# Patient Record
Sex: MALE | Race: WHITE | Employment: UNEMPLOYED | URBAN - METROPOLITAN AREA
[De-identification: names, ages, dates, MRNs, and addresses within clinical notes are randomized per-mention and may not be internally consistent; named-entity substitution may affect disease eponyms.]

---

## 2023-01-05 ENCOUNTER — HOSPITAL ENCOUNTER (OUTPATIENT)
Dept: PREADMISSION TESTING | Age: 58
Discharge: HOME | End: 2023-01-05
Payer: MEDICARE

## 2023-01-05 VITALS
BODY MASS INDEX: 25.61 KG/M2 | DIASTOLIC BLOOD PRESSURE: 85 MMHG | HEART RATE: 72 BPM | RESPIRATION RATE: 18 BRPM | TEMPERATURE: 97.8 F | OXYGEN SATURATION: 97 % | SYSTOLIC BLOOD PRESSURE: 140 MMHG | HEIGHT: 64 IN | WEIGHT: 150 LBS

## 2023-01-05 LAB
ANION GAP SERPL CALCULATED.3IONS-SCNC: 10 MMOL/L (ref 9–17)
CHLORIDE BLD-SCNC: 101 MMOL/L (ref 98–107)
CO2: 25 MMOL/L (ref 20–31)
EKG ATRIAL RATE: 58 BPM
EKG P AXIS: 46 DEGREES
EKG P-R INTERVAL: 192 MS
EKG Q-T INTERVAL: 400 MS
EKG QRS DURATION: 96 MS
EKG QTC CALCULATION (BAZETT): 392 MS
EKG R AXIS: 81 DEGREES
EKG T AXIS: 65 DEGREES
EKG VENTRICULAR RATE: 58 BPM
HCT VFR BLD CALC: 42.3 % (ref 40.7–50.3)
HEMOGLOBIN: 14.2 G/DL (ref 13–17)
MCH RBC QN AUTO: 29.5 PG (ref 25.2–33.5)
MCHC RBC AUTO-ENTMCNC: 33.6 G/DL (ref 28.4–34.8)
MCV RBC AUTO: 87.8 FL (ref 82.6–102.9)
NRBC AUTOMATED: 0 PER 100 WBC
PDW BLD-RTO: 12.9 % (ref 11.8–14.4)
PLATELET # BLD: 233 K/UL (ref 138–453)
PMV BLD AUTO: 9.3 FL (ref 8.1–13.5)
POTASSIUM SERPL-SCNC: 4.1 MMOL/L (ref 3.7–5.3)
RBC # BLD: 4.82 M/UL (ref 4.21–5.77)
SODIUM BLD-SCNC: 136 MMOL/L (ref 135–144)
WBC # BLD: 9.4 K/UL (ref 3.5–11.3)

## 2023-01-05 PROCEDURE — 93005 ELECTROCARDIOGRAM TRACING: CPT | Performed by: STUDENT IN AN ORGANIZED HEALTH CARE EDUCATION/TRAINING PROGRAM

## 2023-01-05 PROCEDURE — 36415 COLL VENOUS BLD VENIPUNCTURE: CPT

## 2023-01-05 PROCEDURE — 85027 COMPLETE CBC AUTOMATED: CPT

## 2023-01-05 PROCEDURE — 80051 ELECTROLYTE PANEL: CPT

## 2023-01-05 RX ORDER — OXYCODONE HYDROCHLORIDE 15 MG/1
15 TABLET ORAL EVERY 4 HOURS PRN
COMMUNITY

## 2023-01-05 RX ORDER — CLONIDINE HYDROCHLORIDE 0.1 MG/1
0.1 TABLET ORAL DAILY
COMMUNITY

## 2023-01-05 RX ORDER — LISINOPRIL 20 MG/1
20 TABLET ORAL DAILY
COMMUNITY

## 2023-01-05 RX ORDER — SENNOSIDES 8.6 MG
650 CAPSULE ORAL EVERY 8 HOURS PRN
COMMUNITY

## 2023-01-05 RX ORDER — ALBUTEROL SULFATE 90 UG/1
2 AEROSOL, METERED RESPIRATORY (INHALATION) EVERY 4 HOURS PRN
COMMUNITY
Start: 2017-06-13

## 2023-01-05 ASSESSMENT — PAIN DESCRIPTION - LOCATION: LOCATION: BACK;LEG

## 2023-01-05 ASSESSMENT — PAIN DESCRIPTION - PAIN TYPE: TYPE: CHRONIC PAIN

## 2023-01-05 ASSESSMENT — PAIN DESCRIPTION - FREQUENCY: FREQUENCY: CONTINUOUS

## 2023-01-05 ASSESSMENT — PAIN DESCRIPTION - ORIENTATION: ORIENTATION: LOWER;LEFT

## 2023-01-05 ASSESSMENT — PAIN DESCRIPTION - DESCRIPTORS: DESCRIPTORS: ACHING;SORE

## 2023-01-05 ASSESSMENT — PAIN SCALES - GENERAL: PAINLEVEL_OUTOF10: 8

## 2023-01-05 NOTE — PRE-PROCEDURE INSTRUCTIONS
[facility] [address] Wednesday, January  at 200 PM    Once you enter the hospital lobby, take the elevators to the second floor. Check-In is at the surgery registration desk. Continue to take your home medications as you normally do up to and including the night before surgery with the exception of any blood thinning medications. Please stop any blood thinning medications as directed by your surgeon or prescribing physician. Failure to stop certain medications may interfere with your scheduled surgery. These may include:  Aspirin, Warfarin (Coumadin), Clopidogrel (Plavix), Ibuprofen (Motrin, Advil), Naproxen (Aleve), Meloxicam (Mobic), Celecoxib (Celebrex), Eliquis, Pradaxa, Xarelto, Effient, Fish Oil, Herbal supplements. Please take the following medication(s) the day of surgery with a small sip of water:  Clonidine    Please use your inhaler(s) if needed and bring your inhaler(s) from home the day of surgery. PREPARING FOR YOUR SURGERY:     Before surgery, you can play an important role in your own health. Because skin is not sterile, we need to be sure that your skin is as free of germs as possible before surgery by carefully washing before surgery. Preparing or prepping skin before surgery can reduce the risk of a surgical site infection.   Do not shave the area of your body where your surgery will be performed unless you received specific permission from your physician. You will need to shower at home the night before surgery and the morning of surgery with a special soap called chlorhexidine gluconate (CHG*). *Not to be used by people allergic to Chlorhexidine Gluconate (CHG). Following these instructions will help you be sure that your skin is clean before surgery. Instructions on cleaning your skin before surgery: The night before your surgery: You will need to shower with warm water (not hot) and the CHG soap.       Use a clean wash cloth and a clean towel. Have clean clothes available to put on after the shower. First wash your hair with regular shampoo. Rinse your hair and body thoroughly to remove the shampoo. Wash your face and genital area (private parts) with your regular soap or water only. Thoroughly rinse your body with warm water from the neck down. Turn water off to prevent rinsing the soap off too soon. With a clean wet washcloth and half of the CHG soap in the bottle, lather your entire body from the neck down. Do not use CHG soap near your eyes or ears to avoid injury to those areas. Wash thoroughly, paying special attention to the area where your surgery will be performed. Wash your body gently for five (5) minutes. Avoid scrubbing your skin too hard. Turn the water back on and rinse your body thoroughly. Pat yourself dry with a clean, soft towel. Do not apply lotion, cream or powder. Dress with clean freshly washed clothes. The morning of surgery:    Repeat shower following steps above - using remaining half of CHG soap in bottle. Patient Instructions: If you are having any type of anesthesia you are to have nothing to eat or drink after midnight the night before your surgery. This includes gum, hard candy, mints, water or smoking or chewing tobacco.  The only exception to this is a small sip of water to take with any morning dose of heart, blood pressure, or seizure medications. No alcoholic beverages for 24 hours prior to surgery. Brush your teeth but do not swallow water. Bring your eye glasses and case with you. No contacts are to be worn the day of surgery. You also may bring your hearing aids. Most surgical procedures involving anesthesia will require that you remove your dentures prior to surgery. Do not wear any jewelry or body piercings day of surgery. Also, NO lotion, perfume or deodorant to be used the day of surgery.   No nail polish on the operative extremity (arm/leg surgeries)    If you are staying overnight with us, please bring a small bag of necessary personal items. Please wear loose, comfortable clothing. If you are potentially going to have a cast or brace bring clothing that will fit over them. In case of illness - If you have cold or flu like symptoms (high fever, runny nose, sore throat, cough, etc.) rash, nausea, vomiting, loose stools, and/or recent contact with someone who has a contagious disease (chicken pox, measles, etc.) Please call your doctor before coming to the hospital.         Day of Surgery/Procedure:    As a patient at [facility] you can expect quality medical and nursing care that is centered on your individual needs. Our goal is to make your surgical experience as comfortable as possible    . Transportation After Your Surgery/Procedure: You will need a friend or family member to drive you home after your procedure. Your  must be 25years of age or older and able to sign off on your discharge instructions. A taxi cab or any other form of public transportation is not acceptable. Your friend or family member must stay at the hospital throughout your procedure. Someone must remain with you for the first 24 hours after your surgery if you receive anesthesia or medication. If you do not have someone to stay with you, your procedure may be cancelled.       If you have any other questions regarding your procedure or the day of surgery, please call [phone]      _________________________  ____________________________  Signature (Patient)              Signature (Provider)               Date
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
## 2023-01-06 ENCOUNTER — ANESTHESIA EVENT (OUTPATIENT)
Dept: OPERATING ROOM | Age: 58
End: 2023-01-06
Payer: MEDICARE

## 2023-01-06 NOTE — FLOWSHEET NOTE
01/06/23 1325   Anesthesia PAT Clearance   Anesthesia Review Status Anes has reviewed patient for surgery  (Dr. Maria Victoria Smalls reviewed history and h/p with no further intervention)

## 2023-01-11 ENCOUNTER — ANESTHESIA (OUTPATIENT)
Dept: OPERATING ROOM | Age: 58
End: 2023-01-11
Payer: MEDICARE

## 2023-01-11 ENCOUNTER — HOSPITAL ENCOUNTER (OUTPATIENT)
Age: 58
Setting detail: OUTPATIENT SURGERY
Discharge: HOME | End: 2023-01-11
Attending: PAIN MEDICINE | Admitting: PAIN MEDICINE
Payer: MEDICARE

## 2023-01-11 ENCOUNTER — APPOINTMENT (OUTPATIENT)
Dept: GENERAL RADIOLOGY | Age: 58
End: 2023-01-11
Attending: PAIN MEDICINE
Payer: MEDICARE

## 2023-01-11 VITALS
WEIGHT: 152.3 LBS | RESPIRATION RATE: 17 BRPM | OXYGEN SATURATION: 95 % | HEIGHT: 64 IN | HEART RATE: 56 BPM | SYSTOLIC BLOOD PRESSURE: 106 MMHG | TEMPERATURE: 97.5 F | DIASTOLIC BLOOD PRESSURE: 84 MMHG | BODY MASS INDEX: 26 KG/M2

## 2023-01-11 PROCEDURE — 2500000003 HC RX 250 WO HCPCS: Performed by: PAIN MEDICINE

## 2023-01-11 PROCEDURE — 2709999900 HC NON-CHARGEABLE SUPPLY: Performed by: PAIN MEDICINE

## 2023-01-11 PROCEDURE — 3600000054 HC PAIN LEVEL 3 BASE: Performed by: PAIN MEDICINE

## 2023-01-11 PROCEDURE — 7100000011 HC PHASE II RECOVERY - ADDTL 15 MIN: Performed by: PAIN MEDICINE

## 2023-01-11 PROCEDURE — 3209999900 FLUORO FOR SURGICAL PROCEDURES

## 2023-01-11 PROCEDURE — 2580000003 HC RX 258: Performed by: ANESTHESIOLOGY

## 2023-01-11 PROCEDURE — C1778 LEAD, NEUROSTIMULATOR: HCPCS | Performed by: PAIN MEDICINE

## 2023-01-11 PROCEDURE — 3600000055 HC PAIN LEVEL 3 ADDL 15 MIN: Performed by: PAIN MEDICINE

## 2023-01-11 PROCEDURE — 3700000000 HC ANESTHESIA ATTENDED CARE: Performed by: PAIN MEDICINE

## 2023-01-11 PROCEDURE — 6360000002 HC RX W HCPCS: Performed by: ANESTHESIOLOGY

## 2023-01-11 PROCEDURE — 3700000001 HC ADD 15 MINUTES (ANESTHESIA): Performed by: PAIN MEDICINE

## 2023-01-11 PROCEDURE — 7100000010 HC PHASE II RECOVERY - FIRST 15 MIN: Performed by: PAIN MEDICINE

## 2023-01-11 DEVICE — O.R. CABLE 1X16, 61CM AND EXTENSION: Type: IMPLANTABLE DEVICE | Site: BACK | Status: FUNCTIONAL

## 2023-01-11 DEVICE — 50CM 16 CONTACT TRIAL LEAD KIT
Type: IMPLANTABLE DEVICE | Site: BACK | Status: FUNCTIONAL
Brand: INFINION™  16

## 2023-01-11 RX ORDER — OXYCODONE HYDROCHLORIDE 5 MG/1
5 TABLET ORAL
Status: DISCONTINUED | OUTPATIENT
Start: 2023-01-11 | End: 2023-01-11 | Stop reason: HOSPADM

## 2023-01-11 RX ORDER — HYDROMORPHONE HYDROCHLORIDE 1 MG/ML
0.25 INJECTION, SOLUTION INTRAMUSCULAR; INTRAVENOUS; SUBCUTANEOUS EVERY 5 MIN PRN
Status: DISCONTINUED | OUTPATIENT
Start: 2023-01-11 | End: 2023-01-11 | Stop reason: HOSPADM

## 2023-01-11 RX ORDER — MIDAZOLAM HYDROCHLORIDE 1 MG/ML
INJECTION INTRAMUSCULAR; INTRAVENOUS PRN
Status: DISCONTINUED | OUTPATIENT
Start: 2023-01-11 | End: 2023-01-11 | Stop reason: SDUPTHER

## 2023-01-11 RX ORDER — SODIUM CHLORIDE 9 MG/ML
INJECTION, SOLUTION INTRAVENOUS PRN
Status: DISCONTINUED | OUTPATIENT
Start: 2023-01-11 | End: 2023-01-11 | Stop reason: HOSPADM

## 2023-01-11 RX ORDER — SODIUM CHLORIDE, SODIUM LACTATE, POTASSIUM CHLORIDE, CALCIUM CHLORIDE 600; 310; 30; 20 MG/100ML; MG/100ML; MG/100ML; MG/100ML
INJECTION, SOLUTION INTRAVENOUS CONTINUOUS
Status: DISCONTINUED | OUTPATIENT
Start: 2023-01-11 | End: 2023-01-11 | Stop reason: HOSPADM

## 2023-01-11 RX ORDER — SODIUM CHLORIDE 0.9 % (FLUSH) 0.9 %
5-40 SYRINGE (ML) INJECTION EVERY 12 HOURS SCHEDULED
Status: DISCONTINUED | OUTPATIENT
Start: 2023-01-11 | End: 2023-01-11 | Stop reason: HOSPADM

## 2023-01-11 RX ORDER — DIPHENHYDRAMINE HYDROCHLORIDE 50 MG/ML
12.5 INJECTION INTRAMUSCULAR; INTRAVENOUS
Status: DISCONTINUED | OUTPATIENT
Start: 2023-01-11 | End: 2023-01-11 | Stop reason: HOSPADM

## 2023-01-11 RX ORDER — LIDOCAINE HYDROCHLORIDE 10 MG/ML
1 INJECTION, SOLUTION EPIDURAL; INFILTRATION; INTRACAUDAL; PERINEURAL
Status: DISCONTINUED | OUTPATIENT
Start: 2023-01-11 | End: 2023-01-11 | Stop reason: HOSPADM

## 2023-01-11 RX ORDER — SODIUM CHLORIDE 9 MG/ML
INJECTION, SOLUTION INTRAVENOUS CONTINUOUS
Status: DISCONTINUED | OUTPATIENT
Start: 2023-01-11 | End: 2023-01-11 | Stop reason: HOSPADM

## 2023-01-11 RX ORDER — CEFAZOLIN SODIUM 1 G/3ML
INJECTION, POWDER, FOR SOLUTION INTRAMUSCULAR; INTRAVENOUS PRN
Status: DISCONTINUED | OUTPATIENT
Start: 2023-01-11 | End: 2023-01-11 | Stop reason: SDUPTHER

## 2023-01-11 RX ORDER — FENTANYL CITRATE 50 UG/ML
INJECTION, SOLUTION INTRAMUSCULAR; INTRAVENOUS PRN
Status: DISCONTINUED | OUTPATIENT
Start: 2023-01-11 | End: 2023-01-11 | Stop reason: SDUPTHER

## 2023-01-11 RX ORDER — ONDANSETRON 2 MG/ML
4 INJECTION INTRAMUSCULAR; INTRAVENOUS
Status: DISCONTINUED | OUTPATIENT
Start: 2023-01-11 | End: 2023-01-11 | Stop reason: HOSPADM

## 2023-01-11 RX ORDER — PROPOFOL 10 MG/ML
INJECTION, EMULSION INTRAVENOUS CONTINUOUS PRN
Status: DISCONTINUED | OUTPATIENT
Start: 2023-01-11 | End: 2023-01-11 | Stop reason: SDUPTHER

## 2023-01-11 RX ORDER — SODIUM CHLORIDE 0.9 % (FLUSH) 0.9 %
5-40 SYRINGE (ML) INJECTION PRN
Status: DISCONTINUED | OUTPATIENT
Start: 2023-01-11 | End: 2023-01-11 | Stop reason: HOSPADM

## 2023-01-11 RX ORDER — BUPIVACAINE HYDROCHLORIDE 5 MG/ML
INJECTION, SOLUTION EPIDURAL; INTRACAUDAL PRN
Status: DISCONTINUED | OUTPATIENT
Start: 2023-01-11 | End: 2023-01-11 | Stop reason: ALTCHOICE

## 2023-01-11 RX ADMIN — CEFAZOLIN 2 G: 1 INJECTION, POWDER, FOR SOLUTION INTRAMUSCULAR; INTRAVENOUS at 16:47

## 2023-01-11 RX ADMIN — MIDAZOLAM 2 MG: 1 INJECTION INTRAMUSCULAR; INTRAVENOUS at 16:41

## 2023-01-11 RX ADMIN — SODIUM CHLORIDE, POTASSIUM CHLORIDE, SODIUM LACTATE AND CALCIUM CHLORIDE: 600; 310; 30; 20 INJECTION, SOLUTION INTRAVENOUS at 13:19

## 2023-01-11 RX ADMIN — PROPOFOL 50 MCG/KG/MIN: 10 INJECTION, EMULSION INTRAVENOUS at 16:41

## 2023-01-11 RX ADMIN — Medication 100 MCG: at 16:41

## 2023-01-11 ASSESSMENT — PAIN - FUNCTIONAL ASSESSMENT: PAIN_FUNCTIONAL_ASSESSMENT: 0-10

## 2023-01-11 ASSESSMENT — ENCOUNTER SYMPTOMS: SHORTNESS OF BREATH: 0

## 2023-01-11 ASSESSMENT — LIFESTYLE VARIABLES: SMOKING_STATUS: 1

## 2023-01-11 NOTE — DISCHARGE INSTRUCTIONS
Spinal Cord Stimulator Discharge Instructions  - Avoid any twisting, bending or exaggerated spine movement  -Do not lift anything heavier than a gallon of milk  -Walking around the house is recommended   - Keep the area of the procedure clean and dry  - Do not shower or bathe, sponge baths only  - Monitor for any signs of infection, such as, redness, swelling, green or yellow foul smelling drainage, increased pain or fever  - Wear abdominal binder as much as possible to support your spine and limit the movement of your spine

## 2023-01-11 NOTE — ANESTHESIA PRE PROCEDURE
Department of Anesthesiology  Preprocedure Note       Name:  Maury Mccarthy   Age:  62 y.o.  :  1965                                          MRN:  [id number]         Date:  2023      Surgeon: Kev Live):  Lyle Farris MD    Procedure: Procedure(s):  SPINAL CORD STIMULATOR IMPLANT TRIAL - [employer]    Medications prior to admission:   Prior to Admission medications    Medication Sig Start Date End Date Taking? Authorizing Provider   oxyCODONE (OXY-IR) 15 MG immediate release tablet Take 15 mg by mouth every 4 hours as needed for Pain.   Patient not taking: Reported on 2023    Historical Provider, MD   cloNIDine (CATAPRES) 0.1 MG tablet Take 0.1 mg by mouth daily    Historical Provider, MD   lisinopril (PRINIVIL;ZESTRIL) 20 MG tablet Take 20 mg by mouth daily    Historical Provider, MD   acetaminophen (TYLENOL) 650 MG extended release tablet Take 650 mg by mouth every 8 hours as needed for Pain    Historical Provider, MD   albuterol sulfate HFA (PROVENTIL;VENTOLIN;PROAIR) 108 (90 Base) MCG/ACT inhaler Inhale 2 puffs into the lungs every 4 hours as needed for Shortness of Breath or Wheezing 17   Historical Provider, MD       Current medications:    Current Facility-Administered Medications   Medication Dose Route Frequency Provider Last Rate Last Admin    lidocaine PF 1 % injection 1 mL  1 mL IntraDERmal Once PRN Aleksandar Hancock MD        0.9 % sodium chloride infusion   IntraVENous Continuous Ndal Gómez Morales MD        lactated ringers infusion   IntraVENous Continuous Therese Morales MD        sodium chloride flush 0.9 % injection 5-40 mL  5-40 mL IntraVENous 2 times per day Aleksandar Hancock MD        sodium chloride flush 0.9 % injection 5-40 mL  5-40 mL IntraVENous PRN Aleksandar Hancock MD        0.9 % sodium chloride infusion   IntraVENous PRN Aleksandar Hancock MD        ceFAZolin (ANCEF) 2000 mg in dextrose 5 % 50 mL IVPB  2,000 mg IntraVENous Once Lyle Farris MD           Allergies: Allergies   Allergen Reactions    Latex Hives    Other Other (See Comments)     Cat hair        Problem List:  There is no problem list on file for this patient. Past Medical History:        Diagnosis Date    [facility]erebral artery occlusion with cerebral infarction [facility]     1995    COPD (chronic obstructive pulmonary disease) ([facility]     DDD (degenerative disc disease), lumbar     Depression     Hyperlipidemia     diet controlled    Hypertension     Small vessel disease ([facility]     per mri per patient done in early 2000s       Past Surgical History:        Procedure Laterality Date    ANKLE FRACTURE SURGERY Bilateral     multiple times    CYST REMOVAL      jaw x 2    HERNIA REPAIR      umbilical    HIP ARTHROPLASTY Bilateral 2013    ROTATOR CUFF REPAIR Bilateral     3 left and 4 right    WISDOM TOOTH EXTRACTION      WRIST FRACTURE SURGERY Left        Social History:    Social History     Tobacco Use    Smoking status: Every Day     Packs/day: 1.00     Types: Cigarettes    Smokeless tobacco: Never   Substance Use Topics    Alcohol use: Yes     Comment: socailly                                Ready to quit: Not Answered  Counseling given: Not Answered      Vital Signs (Current):   Vitals:    01/11/23 1257 01/11/23 1306   BP: (!) 148/95    Pulse: 75    Resp: 18    Temp: 97.5 °F (36.4 °C)    SpO2: 95%    Weight:  152 lb 4.8 oz (69.1 kg)   Height:  5' 4\" (1.626 m)                                              BP Readings from Last 3 Encounters:   01/11/23 (!) 148/95   01/05/23 (!) 140/85       NPO Status: Time of last liquid consumption: 2300                        Time of last solid consumption: 2230                        Date of last liquid consumption: 01/10/23                        Date of last solid food consumption: 01/10/23    BMI:   Wt Readings from Last 3 Encounters:   01/11/23 152 lb 4.8 oz (69.1 kg)   01/05/23 150 lb (68 kg)     Body mass index is 26.14 kg/m².     CBC:   Lab Results Component Value Date/Time    WBC 9.4 01/05/2023 02:29 PM    RBC 4.82 01/05/2023 02:29 PM    HGB 14.2 01/05/2023 02:29 PM    HCT 42.3 01/05/2023 02:29 PM    MCV 87.8 01/05/2023 02:29 PM    RDW 12.9 01/05/2023 02:29 PM     01/05/2023 02:29 PM       CMP:   Lab Results   Component Value Date/Time     01/05/2023 02:29 PM    K 4.1 01/05/2023 02:29 PM     01/05/2023 02:29 PM    CO2 25 01/05/2023 02:29 PM       POC Tests: No results for input(s): POCGLU, POCNA, POCK, POCCL, POCBUN, POCHEMO, POCHCT in the last 72 hours. Coags: No results found for: PROTIME, INR, APTT    HCG (If Applicable): No results found for: PREGTESTUR, PREGSERUM, HCG, HCGQUANT     ABGs: No results found for: PHART, PO2ART, HRF2ZFL, JXB1IJC, BEART, P7VFTGZS     Type & Screen (If Applicable):  No results found for: LABABO, LABRH    Drug/Infectious Status (If Applicable):  No results found for: HIV, HEPCAB    COVID-19 Screening (If Applicable): No results found for: COVID19        Anesthesia Evaluation    Airway: Mallampati: I  TM distance: >3 FB   Neck ROM: limited  Mouth opening: > = 3 FB   Dental:          Pulmonary:   (+) COPD:  sleep apnea:  current smoker    (-) shortness of breath                           Cardiovascular:    (+) hypertension:,     (-)  angina                Neuro/Psych:   (+) CVA: no interval change,             GI/Hepatic/Renal:             Endo/Other:                     Abdominal:             Vascular:           Other Findings:           Anesthesia Plan      MAC     ASA 4                                   Sherley Mccabe MD   1/11/2023

## 2023-01-11 NOTE — BRIEF OP NOTE
Brief Postoperative Note      Patient: Gable Closs  YOB: 1965  MRN: [id number]    Date of Procedure: 1/11/2023    Pre-Op Diagnosis: Lumbar radiculopathy [M54.16]    Post-Op Diagnosis: Same       Procedure(s):  SPINAL CORD STIMULATOR IMPLANT TRIAL - [place]    Surgeon(s):  Fide Jaquez MD    Assistant:  * No surgical staff found *    Anesthesia: Monitor Anesthesia Care    Estimated Blood Loss (mL): Minimal    Complications: None    Specimens:   * No specimens in log *    Implants:  Implant Name Type Inv.  Item Serial No.  Lot No. LRB No. Used Action   KIT LD TRL L50CM SPNL CRD PERC 16 CNTCT W IMAG RDY MRI FULL - [id number]  KIT LD TRL L50CM SPNL CRD PERC 16 CNTCT W IMAG RDY MRI FULL [id number] [employer]  N/A 1 Implanted   KIT LD TRL L50CM SPNL CRD PERC 16 CNTCT W IMAG RDY MRI FULL - [id number]  KIT LD TRL L50CM SPNL CRD PERC 16 CNTCT W IMAG RDY MRI FULL [id number] [employer]  N/A 1 Implanted         Drains: * No LDAs found *    Findings: 2 leads enter t12-l1, top t7 midline, ipg left    Electronically signed by Fide Jaquez MD on 1/11/2023 at 5:26 PM

## 2023-01-11 NOTE — ANESTHESIA POSTPROCEDURE EVALUATION
Department of Anesthesiology  Postprocedure Note    Patient: Khloe Noble  MRN: [id number]  YOB: 1965  Date of evaluation: 1/11/2023      Procedure Summary     Date: 01/11/23 Room / Location: [address] - INPATIENT    Anesthesia Start: 5520 Anesthesia Stop: 0233    Procedure: SPINAL CORD STIMULATOR IMPLANT TRIAL - [employer] (Back) Diagnosis:       Lumbar radiculopathy      (Lumbar radiculopathy [M54.16])    Surgeons: Adam Barger MD Responsible Provider: Stacia Loyd MD    Anesthesia Type: MAC ASA Status: 4          Anesthesia Type: No value filed.     [place] Phase I:      Keenan Phase II:        Anesthesia Post Evaluation    Complications: no
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
## 2023-01-18 NOTE — OP NOTE
Operative Note      Patient: Robin Birmingham  YOB: 1965  MRN: [id number]    Date of Procedure: 1/11/2023    Pre-Op Diagnosis: Lumbar radiculopathy [M54.16], M96.1 lumbar FBSS    Post-Op Diagnosis: Same       Procedure(s):  SPINAL CORD STIMULATOR IMPLANT TRIAL - [employer]    Surgeon(s):  Vasu Mendez MD    Assistant:   * No surgical staff found *    Anesthesia: Monitor Anesthesia Care    Estimated Blood Loss (mL): Minimal    Complications: None    Specimens:   * No specimens in log *    Implants:  Implant Name Type Inv. Item Serial No.  Lot No. LRB No. Used Action   KIT LD TRL L50CM SPNL CRD PERC 16 CNTCT W IMAG RDY MRI FULL - [id number]  KIT LD TRL L50CM SPNL CRD PERC 16 CNTCT W IMAG RDY MRI FULL [id number] [employer]-  N/A 1 Implanted   KIT LD TRL L50CM SPNL CRD PERC 16 CNTCT W IMAG RDY MRI FULL - [id number]  KIT LD TRL L50CM SPNL CRD PERC 16 CNTCT W IMAG RDY MRI FULL [id number] [employer]-  N/A 1 Implanted   CABLE NEUROSTIMULATOR L2FT SPL [id number] SPNL CRD OR EXTN - [id number]  CABLE NEUROSTIMULATOR L2FT SPL [id number] SPNL CRD OR EXTN  [employer]  N/A 2 Implanted         Drains: * No LDAs found *    Findings: Enter T12-L1, significant degenerative changes and scoliosis noted    Detailed Description of Procedure:   SCS TRIAL OPERATIVE NOTE   OPERATION:   1. Implant [employer] TRIAL leads x2, total contacts x32  2. Physician guided fluoroscopy  3. Complex intraoperative programming less than 1 hour. PRE-OP DIAGNOSIS:  Lumbar Radiculopathy M54.16  Lumbar and Thoracic post-laminectomy syndrome M96.1  POST-OP DIAGNOSIS:  Lumbar Radiculopathy M54.16  Lumbar and Thoracic post-laminectomy syndrome M96.1  SURGEON: Lorri Ching.  Cristal Washington MD   ANESTHESIA: MAC and local.   ESTIMATED BLOOD LOSS: none  COMPLICATIONS: None  INDICATIONS: The patient has been approved by pain psychology for Spinal Cord Stimulator trial.  The patient has chronic pain and has tried multiple conservative treatment options including injections, therapy and medications. Neuromodulation in the form of a SCS trial is medically indicated to decrease pain, improve function and reduce medications. These goals have been discussed in detail with the patient and they would like to proceed with SCS trial; informed written consent was obtained and placed on the chart. DESCRIPTION OF PROCEDURE: Under fluoroscopic guidance two curved 14 gauge tuohy needles were advanced into the intralaminar epidural space wide left and right using a paramedian approach to T12-L1 with the aid of loss-of-resistance technique. Aspiration was negative for CSF and paresthesia or blood. Aspiration was negative for CSF and paresthesia or blood. Vastari 16 electrode leads x2 (32 total contacts) advanced to the TOP T7 vertebral body at midline with aid of fluoroscopy in A/P and Lat views. Intraoperative complex electrostimulation programming then commenced. Stimulation achieved coverage of painful areas. The needle was then removed and the lead secured with benzoin and steri-strips. Leads taped to side for potential future IPG site: LEFT, may want Right  The physician withdrew the needle. The patient's back was cleansed and a sterile dressing was applied. There were no complications, and the patient tolerated the procedure well. The patient was then transported to the recovery room in satisfactory condition for additional programing. After adequate programming, the patient was discharged ambulatory and will follow-up in 24 to 48 hours.     Electronically signed by Marco Antonio Starkey MD on 1/17/2023 at 11:23 PM

## 2023-12-07 ENCOUNTER — HOSPITAL ENCOUNTER
Dept: HOSPITAL 101 - LAB | Age: 58
Discharge: HOME | End: 2023-12-07
Payer: MEDICARE

## 2023-12-07 DIAGNOSIS — Z12.5: ICD-10-CM

## 2023-12-07 DIAGNOSIS — Z12.11: ICD-10-CM

## 2023-12-07 DIAGNOSIS — E29.1: ICD-10-CM

## 2023-12-07 DIAGNOSIS — E11.9: ICD-10-CM

## 2023-12-07 DIAGNOSIS — R74.8: ICD-10-CM

## 2023-12-07 DIAGNOSIS — R36.9: ICD-10-CM

## 2023-12-07 DIAGNOSIS — R53.83: ICD-10-CM

## 2023-12-07 DIAGNOSIS — E78.5: ICD-10-CM

## 2023-12-07 DIAGNOSIS — Z11.3: ICD-10-CM

## 2023-12-07 DIAGNOSIS — I10: Primary | ICD-10-CM

## 2023-12-07 PROCEDURE — 36415 COLL VENOUS BLD VENIPUNCTURE: CPT

## 2023-12-07 PROCEDURE — 86695 HERPES SIMPLEX TYPE 1 TEST: CPT

## 2023-12-07 PROCEDURE — 86696 HERPES SIMPLEX TYPE 2 TEST: CPT

## 2023-12-07 PROCEDURE — 80074 ACUTE HEPATITIS PANEL: CPT

## 2023-12-07 PROCEDURE — 87389 HIV-1 AG W/HIV-1&-2 AB AG IA: CPT

## 2023-12-07 PROCEDURE — 86592 SYPHILIS TEST NON-TREP QUAL: CPT

## 2023-12-21 LAB
HSV 1 IGG, TYPE SPEC: (no result)
HSV 2 IGG, TYPE SPEC: (no result)

## 2025-06-13 ENCOUNTER — HOSPITAL ENCOUNTER (EMERGENCY)
Age: 60
Discharge: HOME | End: 2025-06-13
Payer: MEDICARE

## 2025-06-13 VITALS — HEART RATE: 78 BPM

## 2025-06-13 VITALS — SYSTOLIC BLOOD PRESSURE: 109 MMHG | DIASTOLIC BLOOD PRESSURE: 72 MMHG | HEART RATE: 72 BPM

## 2025-06-13 VITALS — DIASTOLIC BLOOD PRESSURE: 76 MMHG | SYSTOLIC BLOOD PRESSURE: 116 MMHG

## 2025-06-13 VITALS
DIASTOLIC BLOOD PRESSURE: 85 MMHG | HEART RATE: 81 BPM | SYSTOLIC BLOOD PRESSURE: 137 MMHG | OXYGEN SATURATION: 99 % | TEMPERATURE: 98.1 F

## 2025-06-13 VITALS — DIASTOLIC BLOOD PRESSURE: 82 MMHG | SYSTOLIC BLOOD PRESSURE: 131 MMHG | HEART RATE: 82 BPM

## 2025-06-13 VITALS — HEART RATE: 89 BPM

## 2025-06-13 VITALS — HEART RATE: 67 BPM | SYSTOLIC BLOOD PRESSURE: 120 MMHG | OXYGEN SATURATION: 100 % | DIASTOLIC BLOOD PRESSURE: 78 MMHG

## 2025-06-13 VITALS — HEART RATE: 95 BPM

## 2025-06-13 VITALS — BODY MASS INDEX: 28.3 KG/M2

## 2025-06-13 VITALS — HEART RATE: 76 BPM

## 2025-06-13 DIAGNOSIS — R42: ICD-10-CM

## 2025-06-13 DIAGNOSIS — I95.9: Primary | ICD-10-CM

## 2025-06-13 LAB
ADD MANUAL DIFF: NO
ANION GAP: 13.2
BASOPHILS # BLD AUTO: 0 10^3/UL (ref 0–0.1)
BASOPHILS NFR BLD AUTO: 0.6 % (ref 0.2–2)
BUN SERPL-MCNC: 20 MG/DL (ref 7–18)
BUN/CREAT SERPL: 23.8
CALCIUM: 8.9 MG/DL (ref 8.5–10.1)
CHLORIDE: 101 MMOL/L (ref 98–107)
CO2 BLD-SCNC: 28.6 MMOL/L (ref 21–32)
CREAT SERPL-SCNC: 0.84 MG/DL (ref 0.7–1.3)
EOSINOPHIL # BLD AUTO: 0.3 10^3/UL (ref 0–0.7)
EOSINOPHIL NFR BLD AUTO: 4.2 % (ref 0.9–7)
ERYTHROCYTE [DISTWIDTH] IN BLOOD BY AUTOMATED COUNT: 12.5 % (ref 11–15)
ESTIMATED GFR (AFRICAN AMERICA: >60
ESTIMATED GFR (NON-AFRICAN AME: >60
GLUCOSE SERPLBLD-MCNC: 121 MG/DL (ref 74–106)
HCT VFR BLD CALC: 45.2 % (ref 42–54)
HEMOGLOBIN: 15.9 G/DL (ref 14–18)
IMMATURE GRANULOCYTES ABS AUTO: 0.01 10^3/UL (ref 0–0.03)
IMMATURE GRANULOCYTES PCT AUTO: 0.1 % (ref 0–0.5)
LYMPHOCYTES  ABSOLUTE AUTO: 1.4 10^3/UL (ref 1.2–3.8)
LYMPHOCYTES PERCENT AUTO: 19 % (ref 20.5–60)
MCH RBC QN AUTO: 29.8 PG (ref 25.9–34)
MCV RBC: 84.8 FL (ref 80–94)
MEAN CORPUSCULAR HGB CONC: 35.2 G/DL (ref 29.9–35.2)
MEAN PLATELET VOLUME: 9.3 FL (ref 9.5–13.5)
MONOCYTES # BLD AUTO: 0.8 10^3/UL (ref 0.3–0.8)
MONOCYTES NFR BLD AUTO: 11.7 % (ref 1.7–12)
NEUTROPHILS # BLD AUTO: 4.6 10^3/UL (ref 1.4–6.5)
NEUTROPHILS NFR BLD AUTO: 64.4 % (ref 43–75)
PLATELET # BLD: 259 10^3/UL (ref 150–450)
POTASSIUM SERPLBLD-SCNC: 3.8 MMOL/L (ref 3.5–5.1)
RBC # BLD AUTO: 5.33 10^6/UL (ref 4.7–6.1)
SODIUM BLD-SCNC: 139 MMOL/L (ref 136–145)
WBC # BLD: 7.1 10^3/UL (ref 4–11)

## 2025-06-13 PROCEDURE — 71045 X-RAY EXAM CHEST 1 VIEW: CPT

## 2025-06-13 PROCEDURE — 80048 BASIC METABOLIC PNL TOTAL CA: CPT

## 2025-06-13 PROCEDURE — 99285 EMERGENCY DEPT VISIT HI MDM: CPT

## 2025-06-13 PROCEDURE — 85025 COMPLETE CBC W/AUTO DIFF WBC: CPT

## 2025-06-13 PROCEDURE — 36415 COLL VENOUS BLD VENIPUNCTURE: CPT

## 2025-06-13 PROCEDURE — 93005 ELECTROCARDIOGRAM TRACING: CPT

## 2025-06-14 ENCOUNTER — HOSPITAL ENCOUNTER (EMERGENCY)
Age: 60
Discharge: HOME | End: 2025-06-14
Payer: MEDICARE

## 2025-06-14 VITALS
SYSTOLIC BLOOD PRESSURE: 140 MMHG | TEMPERATURE: 98.42 F | OXYGEN SATURATION: 97 % | HEART RATE: 112 BPM | DIASTOLIC BLOOD PRESSURE: 103 MMHG

## 2025-06-14 DIAGNOSIS — Z96.612: ICD-10-CM

## 2025-06-14 DIAGNOSIS — Z96.643: ICD-10-CM

## 2025-06-14 DIAGNOSIS — K04.7: Primary | ICD-10-CM

## 2025-06-14 PROCEDURE — 99283 EMERGENCY DEPT VISIT LOW MDM: CPT

## 2025-07-01 ENCOUNTER — HOSPITAL ENCOUNTER
Dept: HOSPITAL 101 - CARD | Age: 60
Discharge: HOME | End: 2025-07-01
Payer: MEDICARE

## 2025-07-01 DIAGNOSIS — R07.89: ICD-10-CM

## 2025-07-01 DIAGNOSIS — R53.83: Primary | ICD-10-CM

## 2025-07-01 PROCEDURE — 93017 CV STRESS TEST TRACING ONLY: CPT

## 2025-07-23 ENCOUNTER — HOSPITAL ENCOUNTER
Dept: HOSPITAL 101 - CT | Age: 60
Discharge: HOME | End: 2025-07-23
Payer: MEDICARE

## 2025-07-23 DIAGNOSIS — M54.12: Primary | ICD-10-CM

## 2025-07-23 DIAGNOSIS — M47.812: ICD-10-CM

## 2025-07-23 PROCEDURE — 72125 CT NECK SPINE W/O DYE: CPT

## (undated) DEVICE — TOWEL,OR,DSP,ST,BLUE,DLX,XR,4/PK,20PK/CS: Brand: MEDLINE

## (undated) DEVICE — GOWN,SIRUS,NONRNF,SETINSLV,XL,20/CS: Brand: MEDLINE

## (undated) DEVICE — SYRINGE MED 5ML STD CLR PLAS LUERLOCK TIP N CTRL DISP

## (undated) DEVICE — PATIENT TRIAL KIT: Brand: WAVEWRITER ALPHA™

## (undated) DEVICE — SHEET, T, LAPAROTOMY, STERILE: Brand: MEDLINE

## (undated) DEVICE — DRESSING TRNSPAR W6XL8IN FLM SURESITE 123

## (undated) DEVICE — NEEDLE SPINAL 22GA L3.5IN SPINOCAN

## (undated) DEVICE — MEDICINE CUP, GRADUATED, STER: Brand: MEDLINE

## (undated) DEVICE — C-ARMOR C-ARM EQUIPMENT COVERS CLEAR STERILE UNIVERSAL FIT 12 PER CASE: Brand: C-ARMOR

## (undated) DEVICE — 3M™ STERI-STRIP™ COMPOUND BENZOIN TINCTURE 40 BAGS/CARTON 4 CARTONS/CASE C1544: Brand: 3M™ STERI-STRIP™

## (undated) DEVICE — SPONGE GZ W4XL4IN RAYON POLY FILL CVR W/ NONWOVEN FAB

## (undated) DEVICE — COVER,TABLE,HEAVY DUTY,50"X90",STRL: Brand: MEDLINE

## (undated) DEVICE — COVER LT HNDL BLU PLAS

## (undated) DEVICE — SYRINGE EPI 7ML LUERLOCK RESISTANCE LOSS EPILOR

## (undated) DEVICE — GAUZE,SPONGE,FLUFF,6"X6.75",STRL,5/TRAY: Brand: MEDLINE

## (undated) DEVICE — NEEDLE HYPO 25GA L1.5IN BLU POLYPR HUB S STL REG BVL STR

## (undated) DEVICE — SYRINGE MED 3ML CLR PLAS STD N CTRL LUERLOCK TIP DISP

## (undated) DEVICE — MARKER SKIN GENTIAN VLT FN TIP W/ 6IN RUL AND 6 LBL

## (undated) DEVICE — DRAPE,REIN 53X77,STERILE: Brand: MEDLINE

## (undated) DEVICE — GLOVE SURG SZ 8 CRM LTX FREE POLYISOPRENE POLYMER BEAD ANTI

## (undated) DEVICE — PREMIUM WET SKIN PREP TRAY: Brand: MEDLINE INDUSTRIES, INC.

## (undated) DEVICE — GAUZE,SPONGE,4"X4",16PLY,XRAY,STRL,LF: Brand: MEDLINE

## (undated) DEVICE — COUNTER NDL 40 COUNT HLD 70 FOAM BLK ADH W/ MAG

## (undated) DEVICE — STRIP,CLOSURE,WOUND,MEDI-STRIP,1/2X4: Brand: MEDLINE

## (undated) DEVICE — 1010 S-DRAPE TOWEL DRAPE 10/BX: Brand: STERI-DRAPE™